# Patient Record
Sex: FEMALE | URBAN - METROPOLITAN AREA
[De-identification: names, ages, dates, MRNs, and addresses within clinical notes are randomized per-mention and may not be internally consistent; named-entity substitution may affect disease eponyms.]

---

## 2022-04-14 ENCOUNTER — OFFICE VISIT (OUTPATIENT)
Dept: URBAN - METROPOLITAN AREA CLINIC 9 | Facility: CLINIC | Age: 62
End: 2022-04-14

## 2022-07-30 ENCOUNTER — TELEPHONE ENCOUNTER (OUTPATIENT)
Age: 62
End: 2022-07-30

## 2022-07-31 ENCOUNTER — TELEPHONE ENCOUNTER (OUTPATIENT)
Age: 62
End: 2022-07-31

## 2024-06-14 ENCOUNTER — DASHBOARD ENCOUNTERS (OUTPATIENT)
Age: 64
End: 2024-06-14

## 2024-06-14 ENCOUNTER — OFFICE VISIT (OUTPATIENT)
Dept: URBAN - METROPOLITAN AREA CLINIC 9 | Facility: CLINIC | Age: 64
End: 2024-06-14
Payer: MEDICARE

## 2024-06-14 VITALS
BODY MASS INDEX: 35.16 KG/M2 | SYSTOLIC BLOOD PRESSURE: 140 MMHG | WEIGHT: 211 LBS | HEIGHT: 65 IN | DIASTOLIC BLOOD PRESSURE: 90 MMHG

## 2024-06-14 DIAGNOSIS — K52.9 COLITIS: ICD-10-CM

## 2024-06-14 DIAGNOSIS — K44.9 HIATAL HERNIA: ICD-10-CM

## 2024-06-14 DIAGNOSIS — K59.00 CONSTIPATION, UNSPECIFIED CONSTIPATION TYPE: ICD-10-CM

## 2024-06-14 DIAGNOSIS — K64.8 OTHER HEMORRHOIDS: ICD-10-CM

## 2024-06-14 DIAGNOSIS — K21.9 GASTROESOPHAGEAL REFLUX DISEASE, UNSPECIFIED WHETHER ESOPHAGITIS PRESENT: ICD-10-CM

## 2024-06-14 PROCEDURE — 99204 OFFICE O/P NEW MOD 45 MIN: CPT | Performed by: STUDENT IN AN ORGANIZED HEALTH CARE EDUCATION/TRAINING PROGRAM

## 2024-06-14 RX ORDER — AMLODIPINE BESYLATE 2.5 MG/1
TAKE ONE TABLET BY MOUTH ONE TIME DAILY TABLET ORAL
Qty: 30 UNSPECIFIED | Refills: 0 | Status: ACTIVE | COMMUNITY

## 2024-06-14 RX ORDER — PANTOPRAZOLE SODIUM 40 MG/1
TAKE ONE TABLET BY MOUTH ONE TIME DAILY TABLET, DELAYED RELEASE ORAL
Qty: 30 UNSPECIFIED | Refills: 0 | Status: ACTIVE | COMMUNITY

## 2024-06-14 RX ORDER — FUROSEMIDE 40 MG/1
TAKE ONE TABLET BY MOUTH ONE TIME DAILY TABLET ORAL
Qty: 30 UNSPECIFIED | Refills: 0 | Status: ACTIVE | COMMUNITY

## 2024-06-14 RX ORDER — ASPIRIN 81 MG/1
CHEW ONE TABLET BY MOUTH ONE TIME DAILY TABLET, CHEWABLE ORAL
Qty: 30 UNSPECIFIED | Refills: 0 | Status: ACTIVE | COMMUNITY

## 2024-06-14 RX ORDER — TROSPIUM CHLORIDE 20 MG/1
TABLET, FILM COATED ORAL
Qty: 90 TABLET | Status: DISCONTINUED | COMMUNITY

## 2024-06-14 RX ORDER — FAMOTIDINE 20 MG/1
1 TABLET TABLET, FILM COATED ORAL
Qty: 180 | Refills: 3 | OUTPATIENT

## 2024-06-14 RX ORDER — FAMOTIDINE 20 MG/1
TAKE ONE TABLET BY MOUTH ONE TIME DAILY TABLET, FILM COATED ORAL
Qty: 30 UNSPECIFIED | Refills: 0 | Status: ACTIVE | COMMUNITY

## 2024-06-14 RX ORDER — PANTOPRAZOLE SODIUM 40 MG/1
1 TABLET TABLET, DELAYED RELEASE ORAL ONCE A DAY
Qty: 90 TABLET | Refills: 3 | OUTPATIENT

## 2024-06-14 RX ORDER — ESCITALOPRAM 10 MG/1
TAKE ONE TABLET BY MOUTH ONE TIME DAILY TABLET, FILM COATED ORAL
Qty: 30 UNSPECIFIED | Refills: 0 | Status: ACTIVE | COMMUNITY

## 2024-06-14 RX ORDER — CARVEDILOL 12.5 MG/1
TAKE ONE TABLET BY MOUTH TWICE A DAY WITH A MEAL TABLET, FILM COATED ORAL
Qty: 60 UNSPECIFIED | Refills: 0 | Status: ACTIVE | COMMUNITY

## 2024-06-14 RX ORDER — POLYETHYLENE GLYCOL 3350 17 G/17G
AS DIRECTED POWDER, FOR SOLUTION ORAL
OUTPATIENT

## 2024-06-14 RX ORDER — TICAGRELOR 90 MG/1
TAKE ONE TABLET BY MOUTH TWICE A DAY TABLET ORAL
Qty: 60 UNSPECIFIED | Refills: 0 | Status: ACTIVE | COMMUNITY

## 2024-06-14 RX ORDER — ATORVASTATIN CALCIUM 80 MG/1
TAKE ONE TABLET BY MOUTH AT BEDTIME TABLET, FILM COATED ORAL
Qty: 30 UNSPECIFIED | Refills: 0 | Status: ACTIVE | COMMUNITY

## 2024-06-14 RX ORDER — TROSPIUM CHLORIDE 20 MG/1
1 TABLET AT BEDTIME ON AN EMPTY STOMACH TABLET, FILM COATED ORAL ONCE A DAY
Qty: 90 TABLET | Status: ACTIVE | COMMUNITY

## 2024-06-14 NOTE — HPI-TODAY'S VISIT:
EGD: 2014- HH, no records available  Colonoscopy: 2014- polyp, return 5 years. No records avaialble  Imaging/Studies/Procedures:   ----  PMH:  GERD, OAB, oral canker sores, hystrectomy, bladder sling, CAD s/p stent 6/2024,   Family Hx:   GI Hx: ER 3/28/24- [facility]- colitis 6/14/24- weight loss, gastritis, blood in stool The patient presented with a history of colitis diagnosed in March. She had a CAT scan and was prescribed antibiotics, but the cause of the colitis was not identified. She has been experiencing weight loss and intermittent blood in the stool. The patient also reported a recent heart attack, for which she had a stent placed. She has been suffering from severe constipation, which she believes is related to her medication. She has a history of acid reflux and is currently on Pantoprazole and Famotidine. She also reported having a hernia and has been experiencing GI symptoms. She has a history of constipation and has been prescribed MiraLAX to manage it.

## 2024-07-11 ENCOUNTER — OFFICE VISIT (OUTPATIENT)
Dept: URBAN - METROPOLITAN AREA CLINIC 9 | Facility: CLINIC | Age: 64
End: 2024-07-11

## 2024-09-16 ENCOUNTER — OFFICE VISIT (OUTPATIENT)
Dept: URBAN - METROPOLITAN AREA CLINIC 9 | Facility: CLINIC | Age: 64
End: 2024-09-16

## 2025-01-24 ENCOUNTER — OFFICE VISIT (OUTPATIENT)
Dept: URBAN - METROPOLITAN AREA CLINIC 9 | Facility: CLINIC | Age: 65
End: 2025-01-24
Payer: MEDICARE

## 2025-01-24 VITALS
DIASTOLIC BLOOD PRESSURE: 74 MMHG | WEIGHT: 225 LBS | HEIGHT: 65 IN | BODY MASS INDEX: 37.49 KG/M2 | SYSTOLIC BLOOD PRESSURE: 130 MMHG

## 2025-01-24 DIAGNOSIS — K58.2 IRRITABLE BOWEL SYNDROME WITH ALTERNATING BOWEL HABITS: ICD-10-CM

## 2025-01-24 DIAGNOSIS — K21.9 ACID REFLUX: ICD-10-CM

## 2025-01-24 DIAGNOSIS — K64.8 INTERNAL HEMORRHOIDS: ICD-10-CM

## 2025-01-24 DIAGNOSIS — Z86.0101 H/O ADENOMATOUS POLYP OF COLON: ICD-10-CM

## 2025-01-24 PROBLEM — 10743008: Status: ACTIVE | Noted: 2025-01-24

## 2025-01-24 PROCEDURE — 99204 OFFICE O/P NEW MOD 45 MIN: CPT | Performed by: PHYSICIAN ASSISTANT

## 2025-01-24 RX ORDER — TICAGRELOR 90 MG/1
TAKE ONE TABLET BY MOUTH TWICE A DAY TABLET ORAL
Qty: 60 UNSPECIFIED | Refills: 0 | Status: DISCONTINUED | COMMUNITY

## 2025-01-24 RX ORDER — AMLODIPINE BESYLATE 2.5 MG/1
TAKE ONE TABLET BY MOUTH ONE TIME DAILY TABLET ORAL
Qty: 30 UNSPECIFIED | Refills: 0 | Status: ACTIVE | COMMUNITY

## 2025-01-24 RX ORDER — CLOPIDOGREL BISULFATE 75 MG/1
1 TABLET TABLET ORAL ONCE A DAY
Status: ACTIVE | COMMUNITY

## 2025-01-24 RX ORDER — PANTOPRAZOLE SODIUM 40 MG/1
1 TABLET TABLET, DELAYED RELEASE ORAL ONCE A DAY
Qty: 90 TABLET | Refills: 3 | Status: DISCONTINUED | COMMUNITY

## 2025-01-24 RX ORDER — OMEPRAZOLE 40 MG/1
1 CAPSULE 30 MINUTES BEFORE MEAL CAPSULE, DELAYED RELEASE ORAL TWICE DAILY
Status: ACTIVE | COMMUNITY

## 2025-01-24 RX ORDER — ESCITALOPRAM 10 MG/1
TAKE ONE TABLET BY MOUTH ONE TIME DAILY TABLET, FILM COATED ORAL
Qty: 30 UNSPECIFIED | Refills: 0 | Status: ACTIVE | COMMUNITY

## 2025-01-24 RX ORDER — TROSPIUM CHLORIDE 20 MG/1
1 TABLET AT BEDTIME ON AN EMPTY STOMACH TABLET, FILM COATED ORAL ONCE A DAY
Qty: 90 TABLET | Status: ACTIVE | COMMUNITY

## 2025-01-24 RX ORDER — ATORVASTATIN CALCIUM 80 MG/1
TAKE ONE TABLET BY MOUTH AT BEDTIME TABLET, FILM COATED ORAL
Qty: 30 UNSPECIFIED | Refills: 0 | Status: DISCONTINUED | COMMUNITY

## 2025-01-24 RX ORDER — FUROSEMIDE 40 MG/1
TAKE ONE TABLET BY MOUTH ONE TIME DAILY TABLET ORAL
Qty: 30 UNSPECIFIED | Refills: 0 | Status: ACTIVE | COMMUNITY

## 2025-01-24 RX ORDER — ASPIRIN 81 MG/1
CHEW ONE TABLET BY MOUTH ONE TIME DAILY TABLET, CHEWABLE ORAL
Qty: 30 UNSPECIFIED | Refills: 0 | Status: ACTIVE | COMMUNITY

## 2025-01-24 RX ORDER — FAMOTIDINE 20 MG/1
1 TABLET TABLET, FILM COATED ORAL
Qty: 180 | Refills: 3 | Status: ACTIVE | COMMUNITY

## 2025-01-24 RX ORDER — ROSUVASTATIN CALCIUM 10 MG/1
1 TABLET TABLET, COATED ORAL ONCE A DAY
Status: ACTIVE | COMMUNITY

## 2025-01-24 RX ORDER — CARVEDILOL 12.5 MG/1
TAKE ONE TABLET BY MOUTH TWICE A DAY WITH A MEAL TABLET, FILM COATED ORAL
Qty: 60 UNSPECIFIED | Refills: 0 | Status: ACTIVE | COMMUNITY

## 2025-01-24 RX ORDER — POLYETHYLENE GLYCOL 3350 17 G/DOSE
AS DIRECTED POWDER (GRAM) ORAL
Status: DISCONTINUED | COMMUNITY

## 2025-01-24 NOTE — HPI-TODAY'S VISIT:
65 y/o female with numerous GI complaints (both upper and lower symptoms) for years, worsening in past year.  Saw Dr. Padron but had just been dx with an MI. Now with 3 stents in place.  Has increased GERD symptoms. Currently on omeprrazole and Famotidine.  GERD - HB, regur, burping, no dysphagia.   Has alternating d/c (~50/50) - did have a recent episode of increased epigastric pain (but can radiate) with increased diarrhea.  Has occ BRBPR and swelling at rectum - has hx hemorrhoids.  No melena, no severe abd pain now. Did have an episode of severe abd pain and dx wtih colitiis. She was very constipated at that time.  Pt denies unintentional weight loss, vomiting/hematochezia,  melena, severe abd. pain, anorexia, early satiety, fever.  Family Hx: Unsure.   EGD: 2014- HH, no records available Colonoscopy: 2014- polyp, return 5 years. No records avaialble  Imaging/Studies/Procedures: CT scan 2024 - small fat-containing umbilical hernia, status postcholecystectomy, scattered diverticula throughout the left colon no evidence of acute diverticulitis Ct  3/28/24- [facility]- colitis - no report to view  Patient denies seizure disorders, ROHINI, use of CPAP, pacemaker or ICD or other implanted wired device, prior issues with anesthesia. + hx CAD, s/p stents - sees Dr. FALLON Cruz. On Plavix - will get CC.  SHe is here to schedule EGD and colon (with random bx)  that she was unable to complete last year d/t MI/stents.

## 2025-02-03 ENCOUNTER — OFFICE VISIT (OUTPATIENT)
Dept: URBAN - METROPOLITAN AREA CLINIC 9 | Facility: CLINIC | Age: 65
End: 2025-02-03

## 2025-02-13 ENCOUNTER — TELEPHONE ENCOUNTER (OUTPATIENT)
Dept: URBAN - METROPOLITAN AREA CLINIC 9 | Facility: CLINIC | Age: 65
End: 2025-02-13

## 2025-04-18 ENCOUNTER — OFFICE VISIT (OUTPATIENT)
Dept: URBAN - METROPOLITAN AREA CLINIC 9 | Facility: CLINIC | Age: 65
End: 2025-04-18